# Patient Record
Sex: FEMALE | NOT HISPANIC OR LATINO | URBAN - METROPOLITAN AREA
[De-identification: names, ages, dates, MRNs, and addresses within clinical notes are randomized per-mention and may not be internally consistent; named-entity substitution may affect disease eponyms.]

---

## 2018-01-30 ENCOUNTER — AMBULATORY - HEALTHEAST (OUTPATIENT)
Dept: LAB | Facility: CLINIC | Age: 28
End: 2018-01-30

## 2018-01-30 DIAGNOSIS — O26.851 SPOTTING IN FIRST TRIMESTER: ICD-10-CM

## 2018-01-30 LAB — HCG SERPL-ACNC: ABNORMAL MLU/ML (ref 0–4)

## 2018-08-31 ASSESSMENT — MIFFLIN-ST. JEOR: SCORE: 1439.83

## 2018-09-05 ENCOUNTER — ANESTHESIA - HEALTHEAST (OUTPATIENT)
Dept: OBGYN | Facility: HOSPITAL | Age: 28
End: 2018-09-05

## 2018-09-05 ENCOUNTER — SURGERY - HEALTHEAST (OUTPATIENT)
Dept: OBGYN | Facility: HOSPITAL | Age: 28
End: 2018-09-05

## 2018-09-07 ENCOUNTER — HOME CARE/HOSPICE - HEALTHEAST (OUTPATIENT)
Dept: HOME HEALTH SERVICES | Facility: HOME HEALTH | Age: 28
End: 2018-09-07

## 2018-09-08 ENCOUNTER — HOME CARE/HOSPICE - HEALTHEAST (OUTPATIENT)
Dept: HOME HEALTH SERVICES | Facility: HOME HEALTH | Age: 28
End: 2018-09-08

## 2021-06-01 VITALS — HEIGHT: 60 IN | WEIGHT: 171.25 LBS | BODY MASS INDEX: 33.62 KG/M2

## 2021-06-16 PROBLEM — Z98.890 STATUS POST DILATION AND CURETTAGE: Status: ACTIVE | Noted: 2018-09-06

## 2021-06-16 PROBLEM — O42.119 PRETERM PROM WITH ONSET OF LABOR MORE THAN 24 HOURS FOLLOWING RUPTURE: Status: ACTIVE | Noted: 2018-09-06

## 2021-06-20 NOTE — ANESTHESIA PREPROCEDURE EVALUATION
Anesthesia Evaluation      Patient summary reviewed   No history of anesthetic complications     Airway   Mallampati: II  Neck ROM: full   Pulmonary - negative ROS and normal exam                          Cardiovascular - negative ROS     PE comment: Tachycardia,     Neuro/Psych - negative ROS     Endo/Other       Comments: PCOS    GI/Hepatic/Renal    (+) GERD,        Other findings:       Retained placenta s/p vaginal delivery      Dental - normal exam                        Anesthesia Plan  Planned anesthetic: general endotracheal  Glidescope intubation  Zofran/Decadron  ASA 2 - emergent   Induction: intravenous   Anesthetic plan and risks discussed with: patient and spouse  Anesthesia plan special considerations: video-assisted, rapid sequence induction, antiemetics,   Post-op plan: routine recovery

## 2021-06-20 NOTE — ANESTHESIA POSTPROCEDURE EVALUATION
Patient: Mary Jo DAWSON Ronning  REMOVAL, RETAINED PLACENTA, DILATION AND CURETTAGE, UTERUS  Anesthesia type: No value filed.    Patient location: PACU  Last vitals:   Vitals:    09/05/18 0500   BP: 113/70   Pulse: 92   Resp: 20   Temp: 37.6  C (99.7  F)   SpO2: 99%     Post vital signs: stable  Level of consciousness: awake and responds to simple questions  Post-anesthesia pain: pain controlled  Post-anesthesia nausea and vomiting: no  Pulmonary: unassisted, return to baseline  Cardiovascular: stable and blood pressure at baseline  Hydration: adequate  Anesthetic events: no    QCDR Measures:  ASA# 11 - Cristiana-op Cardiac Arrest: ASA11B - Patient did NOT experience unanticipated cardiac arrest  ASA# 12 - Cristiana-op Mortality Rate: ASA12B - Patient did NOT die  ASA# 13 - PACU Re-Intubation Rate: ASA13B - Patient did NOT require a new airway mgmt  ASA# 10 - Composite Anes Safety: ASA10A - No serious adverse event    Additional Notes:

## 2021-06-20 NOTE — ANESTHESIA CARE TRANSFER NOTE
Last vitals:   Vitals:    09/05/18 0420   BP: 120/76   Pulse: (!) 115   Resp: 18   Temp:    SpO2: 100%     Patient's level of consciousness is drowsy  Spontaneous respirations: yes  Maintains airway independently: yes  Dentition unchanged: yes  Oropharynx: oropharynx clear of all foreign objects    QCDR Measures:  ASA# 20 - Surgical Safety Checklist: WHO surgical safety checklist completed prior to induction  PQRS# 430 - Adult PONV Prevention: 4558F - Pt received => 2 anti-emetic agents (different classes) preop & intraop  ASA# 8 - Peds PONV Prevention: NA - Not pediatric patient, not GA or 2 or more risk factors NOT present  PQRS# 424 - Cristiana-op Temp Management: 4559F - At least one body temp DOCUMENTED => 35.5C or 95.9F within required timeframe  PQRS# 426 - PACU Transfer Protocol: - Transfer of care checklist used  ASA# 14 - Acute Post-op Pain: ASA14B - Patient did NOT experience pain >= 7 out of 10

## 2021-06-27 ENCOUNTER — HEALTH MAINTENANCE LETTER (OUTPATIENT)
Age: 31
End: 2021-06-27

## 2021-07-14 PROBLEM — Z34.90 PREGNANT: Status: RESOLVED | Noted: 2018-08-31 | Resolved: 2018-09-06

## 2021-10-17 ENCOUNTER — HEALTH MAINTENANCE LETTER (OUTPATIENT)
Age: 31
End: 2021-10-17

## 2022-07-23 ENCOUNTER — HEALTH MAINTENANCE LETTER (OUTPATIENT)
Age: 32
End: 2022-07-23

## 2022-10-01 ENCOUNTER — HEALTH MAINTENANCE LETTER (OUTPATIENT)
Age: 32
End: 2022-10-01

## 2023-08-12 ENCOUNTER — HEALTH MAINTENANCE LETTER (OUTPATIENT)
Age: 33
End: 2023-08-12

## 2024-12-01 ENCOUNTER — HOSPITAL ENCOUNTER (EMERGENCY)
Facility: CLINIC | Age: 34
Discharge: HOME | End: 2024-12-01
Attending: EMERGENCY MEDICINE | Admitting: EMERGENCY MEDICINE
Payer: COMMERCIAL

## 2024-12-01 ENCOUNTER — APPOINTMENT (OUTPATIENT)
Dept: CT IMAGING | Facility: CLINIC | Age: 34
End: 2024-12-01
Payer: COMMERCIAL

## 2024-12-01 ENCOUNTER — APPOINTMENT (OUTPATIENT)
Dept: ULTRASOUND IMAGING | Facility: CLINIC | Age: 34
End: 2024-12-01
Payer: COMMERCIAL

## 2024-12-01 VITALS
WEIGHT: 180 LBS | OXYGEN SATURATION: 95 % | HEIGHT: 60 IN | TEMPERATURE: 98 F | RESPIRATION RATE: 18 BRPM | DIASTOLIC BLOOD PRESSURE: 71 MMHG | BODY MASS INDEX: 35.34 KG/M2 | HEART RATE: 79 BPM | SYSTOLIC BLOOD PRESSURE: 132 MMHG

## 2024-12-01 DIAGNOSIS — N20.1 URETEROLITHIASIS: ICD-10-CM

## 2024-12-01 LAB
ALBUMIN SERPL BCG-MCNC: 4.4 G/DL (ref 3.5–5.2)
ALBUMIN UR-MCNC: 30 MG/DL
ALP SERPL-CCNC: 89 U/L (ref 40–150)
ALT SERPL W P-5'-P-CCNC: 17 U/L (ref 0–50)
ANION GAP SERPL CALCULATED.3IONS-SCNC: 12 MMOL/L (ref 7–15)
APPEARANCE UR: CLEAR
AST SERPL W P-5'-P-CCNC: 22 U/L (ref 0–45)
BACTERIA #/AREA URNS HPF: ABNORMAL /HPF
BASOPHILS # BLD AUTO: 0 10E3/UL (ref 0–0.2)
BASOPHILS NFR BLD AUTO: 1 %
BILIRUB DIRECT SERPL-MCNC: <0.2 MG/DL (ref 0–0.3)
BILIRUB SERPL-MCNC: 0.3 MG/DL
BILIRUB UR QL STRIP: NEGATIVE
BUN SERPL-MCNC: 13.8 MG/DL (ref 6–20)
CALCIUM SERPL-MCNC: 9.2 MG/DL (ref 8.8–10.4)
CHLORIDE SERPL-SCNC: 104 MMOL/L (ref 98–107)
COLOR UR AUTO: YELLOW
CREAT SERPL-MCNC: 0.81 MG/DL (ref 0.51–0.95)
EGFRCR SERPLBLD CKD-EPI 2021: >90 ML/MIN/1.73M2
EOSINOPHIL # BLD AUTO: 0.3 10E3/UL (ref 0–0.7)
EOSINOPHIL NFR BLD AUTO: 7 %
ERYTHROCYTE [DISTWIDTH] IN BLOOD BY AUTOMATED COUNT: 12.2 % (ref 10–15)
GLUCOSE SERPL-MCNC: 100 MG/DL (ref 70–99)
GLUCOSE UR STRIP-MCNC: NEGATIVE MG/DL
HCG INTACT+B SERPL-ACNC: <1 MIU/ML
HCO3 SERPL-SCNC: 23 MMOL/L (ref 22–29)
HCT VFR BLD AUTO: 35.9 % (ref 35–47)
HGB BLD-MCNC: 12.1 G/DL (ref 11.7–15.7)
HGB UR QL STRIP: ABNORMAL
IMM GRANULOCYTES # BLD: 0 10E3/UL
IMM GRANULOCYTES NFR BLD: 0 %
KETONES UR STRIP-MCNC: 20 MG/DL
LEUKOCYTE ESTERASE UR QL STRIP: ABNORMAL
LIPASE SERPL-CCNC: 19 U/L (ref 13–60)
LYMPHOCYTES # BLD AUTO: 0.6 10E3/UL (ref 0.8–5.3)
LYMPHOCYTES NFR BLD AUTO: 15 %
MCH RBC QN AUTO: 26.7 PG (ref 26.5–33)
MCHC RBC AUTO-ENTMCNC: 33.7 G/DL (ref 31.5–36.5)
MCV RBC AUTO: 79 FL (ref 78–100)
MONOCYTES # BLD AUTO: 0.4 10E3/UL (ref 0–1.3)
MONOCYTES NFR BLD AUTO: 10 %
MUCOUS THREADS #/AREA URNS LPF: PRESENT /LPF
NEUTROPHILS # BLD AUTO: 2.7 10E3/UL (ref 1.6–8.3)
NEUTROPHILS NFR BLD AUTO: 68 %
NITRATE UR QL: NEGATIVE
NRBC # BLD AUTO: 0 10E3/UL
NRBC BLD AUTO-RTO: 0 /100
PH UR STRIP: 5.5 [PH] (ref 5–7)
PLATELET # BLD AUTO: 176 10E3/UL (ref 150–450)
POTASSIUM SERPL-SCNC: 4.2 MMOL/L (ref 3.4–5.3)
PROT SERPL-MCNC: 7 G/DL (ref 6.4–8.3)
RADIOLOGIST FLAGS: NORMAL
RBC # BLD AUTO: 4.53 10E6/UL (ref 3.8–5.2)
RBC URINE: 43 /HPF
SODIUM SERPL-SCNC: 139 MMOL/L (ref 135–145)
SP GR UR STRIP: 1.03 (ref 1–1.03)
SQUAMOUS EPITHELIAL: 4 /HPF
UROBILINOGEN UR STRIP-MCNC: <2 MG/DL
WBC # BLD AUTO: 3.9 10E3/UL (ref 4–11)
WBC URINE: 4 /HPF

## 2024-12-01 PROCEDURE — 250N000011 HC RX IP 250 OP 636

## 2024-12-01 PROCEDURE — 99285 EMERGENCY DEPT VISIT HI MDM: CPT | Mod: 25

## 2024-12-01 PROCEDURE — 80048 BASIC METABOLIC PNL TOTAL CA: CPT

## 2024-12-01 PROCEDURE — 85004 AUTOMATED DIFF WBC COUNT: CPT

## 2024-12-01 PROCEDURE — 80053 COMPREHEN METABOLIC PANEL: CPT

## 2024-12-01 PROCEDURE — 83690 ASSAY OF LIPASE: CPT

## 2024-12-01 PROCEDURE — 74177 CT ABD & PELVIS W/CONTRAST: CPT

## 2024-12-01 PROCEDURE — 87086 URINE CULTURE/COLONY COUNT: CPT

## 2024-12-01 PROCEDURE — 81003 URINALYSIS AUTO W/O SCOPE: CPT

## 2024-12-01 PROCEDURE — 84702 CHORIONIC GONADOTROPIN TEST: CPT

## 2024-12-01 PROCEDURE — 84460 ALANINE AMINO (ALT) (SGPT): CPT

## 2024-12-01 PROCEDURE — 36415 COLL VENOUS BLD VENIPUNCTURE: CPT

## 2024-12-01 PROCEDURE — 96372 THER/PROPH/DIAG INJ SC/IM: CPT | Mod: 59

## 2024-12-01 PROCEDURE — 93976 VASCULAR STUDY: CPT

## 2024-12-01 PROCEDURE — 96365 THER/PROPH/DIAG IV INF INIT: CPT | Mod: 59

## 2024-12-01 PROCEDURE — 82248 BILIRUBIN DIRECT: CPT

## 2024-12-01 RX ORDER — CEFTRIAXONE 1 G/1
1 INJECTION, POWDER, FOR SOLUTION INTRAMUSCULAR; INTRAVENOUS ONCE
Status: COMPLETED | OUTPATIENT
Start: 2024-12-01 | End: 2024-12-01

## 2024-12-01 RX ORDER — ACETAMINOPHEN 500 MG
1000 TABLET ORAL EVERY 6 HOURS
Qty: 56 TABLET | Refills: 0 | Status: SHIPPED | OUTPATIENT
Start: 2024-12-01 | End: 2024-12-08

## 2024-12-01 RX ORDER — IOPAMIDOL 755 MG/ML
90 INJECTION, SOLUTION INTRAVASCULAR ONCE
Status: COMPLETED | OUTPATIENT
Start: 2024-12-01 | End: 2024-12-01

## 2024-12-01 RX ORDER — IBUPROFEN 200 MG
400 TABLET ORAL EVERY 6 HOURS
Qty: 56 TABLET | Refills: 0 | Status: SHIPPED | OUTPATIENT
Start: 2024-12-01 | End: 2024-12-08

## 2024-12-01 RX ORDER — KETOROLAC TROMETHAMINE 15 MG/ML
15 INJECTION, SOLUTION INTRAMUSCULAR; INTRAVENOUS ONCE
Status: COMPLETED | OUTPATIENT
Start: 2024-12-01 | End: 2024-12-01

## 2024-12-01 RX ADMIN — CEFTRIAXONE 1 G: 1 INJECTION, POWDER, FOR SOLUTION INTRAMUSCULAR; INTRAVENOUS at 15:25

## 2024-12-01 RX ADMIN — KETOROLAC TROMETHAMINE 15 MG: 15 INJECTION, SOLUTION INTRAMUSCULAR; INTRAVENOUS at 16:33

## 2024-12-01 RX ADMIN — IOPAMIDOL 90 ML: 755 INJECTION, SOLUTION INTRAVENOUS at 15:30

## 2024-12-01 ASSESSMENT — ACTIVITIES OF DAILY LIVING (ADL)
ADLS_ACUITY_SCORE: 41

## 2024-12-01 ASSESSMENT — COLUMBIA-SUICIDE SEVERITY RATING SCALE - C-SSRS
2. HAVE YOU ACTUALLY HAD ANY THOUGHTS OF KILLING YOURSELF IN THE PAST MONTH?: NO
6. HAVE YOU EVER DONE ANYTHING, STARTED TO DO ANYTHING, OR PREPARED TO DO ANYTHING TO END YOUR LIFE?: NO
1. IN THE PAST MONTH, HAVE YOU WISHED YOU WERE DEAD OR WISHED YOU COULD GO TO SLEEP AND NOT WAKE UP?: NO

## 2024-12-01 NOTE — Clinical Note
Mary Jo Vital was seen and treated in our emergency department on 12/1/2024.  She may return to work on 12/04/2024.       If you have any questions or concerns, please don't hesitate to call.      Christos Luna, DO

## 2024-12-01 NOTE — ED PROVIDER NOTES
EMERGENCY DEPARTMENT ENCOUNTER      NAME: Mary Jo Vital  AGE: 34 year old female  YOB: 1990  MRN: [id number]  EVALUATION DATE & TIME: 12/1/2024  2:04 PM    PCP: Ro Kemp    ED PROVIDER: Francheska Sparks PA-C      Chief Complaint   Patient presents with    Flank Pain         FINAL IMPRESSION:  1. Ureterolithiasis          ED COURSE & MEDICAL DECISION MAKING:    Pertinent Labs & Imaging studies reviewed. (See chart for details)  34 year old female presents to the Emergency Department for evaluation of right flank pain that radiates to her right lower quadrant.  Started at 8 PM yesterday evening.  Denies a history of kidney stones.  Vital signs reviewed and unremarkable.  Afebrile.  On exam, patient is alert and answering questions appropriately.  Right flank tenderness and right lower quadrant abdominal pain.  Lungs are clear to auscultation bilaterally.  No normal rate.  No rash.    Differential diagnosis includes kidney stone, UTI, hydronephrosis, pyelonephritis, diverticulitis.  CBC shows a white blood cell count of 3.9.  Hemoglobin is 12.1.  Basic shows a sodium and potassium within normal limits.  Anion gap is 12.  Creatinine is 0.81.  Patient hepatic is within normal limits.  Patient's not pregnant.  UA shows 250 leuks with no nitrates.  Culture is pending.  High suspicion that this is contaminated but out of abundance of precaution patient did receive a dose of ceftriaxone here.  I did discuss the UA results with Dr. Luna who feels the urine was contaminated. CT of the abdomen shows an obstructing 5 mm distal right urethral calculus in the urethrovesical junction resulting in moderate right hydronephrosis.  2 punctate obstructing right intrarenal calculi.  No left renal calculi.  Intermediate 1.8 cm right hepatic lesion most likely hemangioma.  Recommended a nonemergent contrast enhancing liver MRI.  Mild spinal megaly.  Small hiatal hernia.  Patient was educated on the results and  agrees with the plan.  Patient will follow-up with  urology and primary care doctor to discuss her ED visit.  Patient return to the ED if new symptoms develop or symptoms worsen.  Patient was prescribed ibuprofen, Tylenol.  Patient will be referred to Urology.  Strict return precautions were provided to the patient.    ED COURSE:   2:16 PM I saw the patient.   3:00 PM Staffed with Dr. Luna.   5:03 PM Patient will be discharge home.  Patient is a plan.  All questions answered.       At the conclusion of the encounter I discussed the results of all of the tests and the disposition. The questions were answered. The patient or family acknowledged understanding and was agreeable with the care plan.     0 minutes of critical care time       Medical Decision Making  Obtained supplemental history:Supplemental history obtained?: No  Reviewed external records: External records reviewed?: Documented in chart  Care impacted by chronic illness: PCOS  Care significantly affected by social determinants of health:N/A  Did you consider but not order tests?: Work up considered but not performed and documented in chart, if applicable  Did you interpret images independently?: Independent interpretation of ECG and images noted in documentation, when applicable.  Consultation discussion with other provider:Did you involve another provider (consultant, MH, pharmacy, etc.)?: I discussed the care with another health care provider, see documentation for details.  Discharge. I prescribed additional prescription strength medication(s) as charted. See documentation for any additional details.    Not Applicable    MEDICATIONS GIVEN IN THE EMERGENCY:  Medications   cefTRIAXone (ROCEPHIN) 1 g vial to attach to  mL bag for ADULTS or NS 50 mL bag for PEDS (0 g Intravenous Stopped 12/1/24 1628)   iopamidol (ISOVUE-370) solution 90 mL (90 mLs Intravenous $Given 12/1/24 1530)   ketorolac (TORADOL) injection 15 mg (15 mg Intramuscular $Given  12/1/24 1633)       NEW PRESCRIPTIONS STARTED AT TODAY'S ER VISIT  New Prescriptions    ACETAMINOPHEN (TYLENOL) 500 MG TABLET    Take 2 tablets (1,000 mg) by mouth every 6 hours for 7 days.    IBUPROFEN (ADVIL/MOTRIN) 200 MG TABLET    Take 2 tablets (400 mg) by mouth every 6 hours for 7 days.          =================================================================    HPI    Patient information was obtained from: patient    Use of : N/A         Mary Jo Vital is a 34 year old female with a pertinent history of PCOS who presents to this ED via private vehicle with significant other for evaluation of abdominal pain.    Patient reports onset of constant, progressively worsening RLQ abdominal pain and right lower back pain that began yesterday evening around 16:00. Notes associated diaphoresis last night as well. No fever or chills. She took an epsom salt bath without relief. Denies history of kidney stones. Of note, patient is unsure about possibility of pregnancy, stating she is currently breastfeeding her five month old and has not had return of her menstrual period since most recent vaginal delivery. Patient otherwise denies vaginal bleeding or discharge, dysuria, hematuria, urinary urgency or frequency, or any other symptoms or concerns at this time.        REVIEW OF SYSTEMS   As per HPI    PAST MEDICAL HISTORY:  Past Medical History:   Diagnosis Date    PCOS (polycystic ovarian syndrome)        PAST SURGICAL HISTORY:  Past Surgical History:   Procedure Laterality Date    HC DILATION/CURETTAGE DIAG/THER NON OB N/A 9/5/2018    Procedure: DILATION AND CURETTAGE, UTERUS;  Surgeon: Ayana Garcia MD;  [facility];  Service: Obstetrics    NO PAST SURGERIES             CURRENT MEDICATIONS:    acetaminophen (TYLENOL) 500 MG tablet  ibuprofen (ADVIL/MOTRIN) 200 MG tablet  cholecalciferol, vitamin D3, (VITAMIN D3) 4,000 unit cap  dibucaine (NUPERCAINAL) 1 % ointment  lanolin (LANSINOH HPA)  100 % Oint  prenatal vitamin iron-folic acid 27mg-0.8mg (PRENATAL S) 27 mg iron- 800 mcg Tab tablet  witch hazel (TUCKS) 12.5-50 % PadM        ALLERGIES:  Allergies   Allergen Reactions    Latex Unknown    Sulfa (Sulfonamide Antibiotics) [Sulfa Antibiotics] Unknown       FAMILY HISTORY:  No family history on file.    SOCIAL HISTORY:   Social History     Socioeconomic History    Marital status:    Tobacco Use    Smoking status: Never    Smokeless tobacco: Never   Substance and Sexual Activity    Alcohol use: No    Drug use: No     Social Drivers of Health     Financial Resource Strain: Low Risk  (6/25/2024)    Received from [facility]    Financial Resource Strain     Difficulty of Paying Living Expenses: 3   Food Insecurity: No Food Insecurity (6/25/2024)    Received from [facility]    Food Insecurity     Do you worry your food will run out before you are able to buy more?: 1   Transportation Needs: No Transportation Needs (6/25/2024)    Received from [facility]    Transportation Needs     Does lack of transportation keep you from medical appointments?: 1     Does lack of transportation keep you from work, meetings or getting things that you need?: 1   Social Connections: Socially Integrated (6/25/2024)    Received from [facility]    Social Connections     Do you often feel lonely or isolated from those around you?: 0   Housing Stability: Low Risk  (6/25/2024)    Received from [facility]    Housing Stability     What is your housing situation today?: 1       VITALS:  /64   Pulse 79   Temp 98  F (36.7  C) (Oral)   Resp 18   Ht 1.524 m (5')   Wt 81.6 kg (180 lb)   SpO2 95%   Breastfeeding Yes   BMI 35.15 kg/m      PHYSICAL EXAM    Physical Exam  Vitals and nursing note reviewed.   Constitutional:       Appearance: Normal  appearance.   HENT:      Head: Atraumatic.      Right Ear: External ear normal.      Left Ear: External ear normal.      Nose: Nose normal.      Mouth/Throat:      Mouth: Mucous membranes are moist.   Eyes:      Conjunctiva/sclera: Conjunctivae normal.      Pupils: Pupils are equal, round, and reactive to light.   Cardiovascular:      Rate and Rhythm: Normal rate and regular rhythm.      Pulses: Normal pulses.      Heart sounds: Normal heart sounds. No murmur heard.     No friction rub. No gallop.   Pulmonary:      Effort: Pulmonary effort is normal.      Breath sounds: Normal breath sounds. No wheezing or rales.   Abdominal:      Tenderness: There is abdominal tenderness (RLQ). There is no guarding or rebound.   Musculoskeletal:      Cervical back: Normal range of motion.   Skin:     General: Skin is dry.   Neurological:      Mental Status: She is alert. Mental status is at baseline.   Psychiatric:         Mood and Affect: Mood normal.         Thought Content: Thought content normal.          LAB:  All pertinent labs reviewed and interpreted.  Labs Ordered and Resulted from Time of ED Arrival to Time of ED Departure   BASIC METABOLIC PANEL - Abnormal       Result Value    Sodium 139      Potassium 4.2      Chloride 104      Carbon Dioxide (CO2) 23      Anion Gap 12      Urea Nitrogen 13.8      Creatinine 0.81      GFR Estimate >90      Calcium 9.2      Glucose 100 (*)    ROUTINE UA WITH MICROSCOPIC REFLEX TO CULTURE - Abnormal    Color Urine Yellow      Appearance Urine Clear      Glucose Urine Negative      Bilirubin Urine Negative      Ketones Urine 20 (*)     Specific Gravity Urine 1.031 (*)     Blood Urine 0.2 mg/dL (*)     pH Urine 5.5      Protein Albumin Urine 30 (*)     Urobilinogen Urine <2.0      Nitrite Urine Negative      Leukocyte Esterase Urine 250 Louis/uL (*)     Bacteria Urine Few (*)     Mucus Urine Present (*)     RBC Urine 43 (*)     WBC Urine 4      Squamous Epithelials Urine 4 (*)    CBC WITH  PLATELETS AND DIFFERENTIAL - Abnormal    WBC Count 3.9 (*)     RBC Count 4.53      Hemoglobin 12.1      Hematocrit 35.9      MCV 79      MCH 26.7      MCHC 33.7      RDW 12.2      Platelet Count 176      % Neutrophils 68      % Lymphocytes 15      % Monocytes 10      % Eosinophils 7      % Basophils 1      % Immature Granulocytes 0      NRBCs per 100 WBC 0      Absolute Neutrophils 2.7      Absolute Lymphocytes 0.6 (*)     Absolute Monocytes 0.4      Absolute Eosinophils 0.3      Absolute Basophils 0.0      Absolute Immature Granulocytes 0.0      Absolute NRBCs 0.0     LIPASE - Normal    Lipase 19     HEPATIC FUNCTION PANEL - Normal    Protein Total 7.0      Albumin 4.4      Bilirubin Total 0.3      Alkaline Phosphatase 89      AST 22      ALT 17      Bilirubin Direct <0.20     HCG QUANTITATIVE PREGNANCY - Normal    hCG Quantitative <1     URINE CULTURE        RADIOLOGY:  Reviewed all pertinent imaging. Please see official radiology report.  CT Abdomen Pelvis w Contrast   Final Result   IMPRESSION:       1.  Obstructing 5 mm distal right ureteral calculus at the ureterovesical junction resulting in moderate right hydronephrosis.      2.  Two punctate nonobstructing right intrarenal calculi. No left renal calculi.      3.  Indeterminant 1.8 cm right hepatic lesion, most likely a hemangioma. This can be confirmed with a nonemergent contrast-enhanced liver MRI.      4.  Mild splenomegaly.      5.  Small hiatal hernia.      [Recommend Follow Up: Liver lesion]      This report will be copied to the [facility] to ensure a provider acknowledges the finding.      US Pelvis Cmplt w Transvag & Doppler LmtPel Duplex Limited   Final Result   IMPRESSION:        1.  No evidence of ovarian torsion or other acute abnormality.      2.  Small amount of free fluid within the pelvis, within physiologic limits.          Eufemia FIELDS, am serving as a scribe to document services personally performed by Francheska Sparks,  LAZARO, based on my observation and the provider's statements to me. I, Francheska Sparks PA-C, attest that Eufemia Santana is acting in a scribe capacity, has observed my performance of the services and has documented them in accordance with my direction.    Francheska Sparks PA-C  [facility] EMERGENCY ROOM  [address] [id number] [phone]      Francheska Sparks PA-C  12/05/24 1538

## 2024-12-01 NOTE — ED PROVIDER NOTES
Emergency Department Midlevel Supervisory Note     I had a face to face encounter with this patient seen by the Advanced Practice Provider (DARIN). I personally made/approved the management plan and take responsibility for the patient management. I personally saw patient and performed a substantive portion of the visit including all aspects of the medical decision making.     ED Course:  3:07 PM Francheska Sparks PA-C staffed patient with me. I agree with their assessment and plan of management, and I will see the patient.  3:09 PM I met with the patient to introduce myself, gather additional history, perform my initial exam, and discuss the plan.     Brief HPI:     Mary Jo Vital is a 34 year old female who presents for evaluation of abdominal pain. Constant, progressively worsening RLQ abdominal pain and right lower back pain that began yesterday evening around 16:00. Notes associated diaphoresis last night as well. Epsom salt bath with no relief. Unsure about possibility of pregnancy, stating she is currently breastfeeding her five month old and has not had return of her menstrual period since most recent vaginal delivery. No other complaints or concerns at this time.      I, Consuelo Beard, am serving as a scribe to document services personally performed by Christos Luna DO, based on my observations and the provider's statements to me.   I, Christos Luna DO attest that Consuelo Beard was acting in a scribe capacity, has observed my performance of the services and has documented them in accordance with my direction.    Brief Physical Exam: /71   Pulse 79   Temp 98  F (36.7  C) (Oral)   Resp 18   Ht 1.524 m (5')   Wt 81.6 kg (180 lb)   SpO2 95%   Breastfeeding Yes   BMI 35.15 kg/m    Constitutional:  Alert, in no acute distress  EYES: Conjunctivae clear  HENT:  Atraumatic  Respiratory:  Respirations even, unlabored, in no acute respiratory distress  Cardiovascular:  Regular rate and rhythm, good peripheral  perfusion  GI: Soft, non-distended, mild right lower quadrant tenderness without rebound or guarding  Musculoskeletal:  Moves all 4 extremities equally, grossly symmetrical strength  Back:  no CVAT, no midline or paraspinal tenderness  Integument: Warm & dry. No appreciable rash, erythema.  Neurologic:  Alert & oriented, speech clear and fluent, no focal deficits noted  Psych: Normal mood and affect       MDM:  34-year-old female presenting to the emergency department with complaint of right lower quadrant pain and right-sided flank pain.  Initial concern was for pyelonephritis versus urolithiasis versus appendicitis versus other acute cause.  We did give a dose of Rocephin, but after further evaluation of the patient's lab testing, including UA, does not appear that this represents a pyelonephritis or infected urolithiasis.  CT scan was performed without evidence of appendicitis, colitis, bowel obstruction, but does show a very obvious right-sided ureterolithiasis, and I do believe this to be the underlying cause of the patient's symptoms.  At this time, I do not see indication for admission and believe this patient can follow-up as an outpatient with urology/[facility].  Return precautions were discussed.       1. Ureterolithiasis          Labs and Imaging:  Results for orders placed or performed during the hospital encounter of 12/01/24   CT Abdomen Pelvis w Contrast   Result Value Ref Range    Radiologist flags Liver lesion     Impression    IMPRESSION:     1.  Obstructing 5 mm distal right ureteral calculus at the ureterovesical junction resulting in moderate right hydronephrosis.    2.  Two punctate nonobstructing right intrarenal calculi. No left renal calculi.    3.  Indeterminant 1.8 cm right hepatic lesion, most likely a hemangioma. This can be confirmed with a nonemergent contrast-enhanced liver MRI.    4.  Mild splenomegaly.    5.  Small hiatal hernia.    [Recommend Follow Up: Liver lesion]    This report will  be copied to the [facility] to ensure a provider acknowledges the finding.   US Pelvis Cmplt w Transvag & Doppler LmtPel Duplex Limited    Impression    IMPRESSION:      1.  No evidence of ovarian torsion or other acute abnormality.    2.  Small amount of free fluid within the pelvis, within physiologic limits.   Basic metabolic panel   Result Value Ref Range    Sodium 139 135 - 145 mmol/L    Potassium 4.2 3.4 - 5.3 mmol/L    Chloride 104 98 - 107 mmol/L    Carbon Dioxide (CO2) 23 22 - 29 mmol/L    Anion Gap 12 7 - 15 mmol/L    Urea Nitrogen 13.8 6.0 - 20.0 mg/dL    Creatinine 0.81 0.51 - 0.95 mg/dL    GFR Estimate >90 >60 mL/min/1.73m2    Calcium 9.2 8.8 - 10.4 mg/dL    Glucose 100 (H) 70 - 99 mg/dL   Result Value Ref Range    Lipase 19 13 - 60 U/L   Hepatic function panel   Result Value Ref Range    Protein Total 7.0 6.4 - 8.3 g/dL    Albumin 4.4 3.5 - 5.2 g/dL    Bilirubin Total 0.3 <=1.2 mg/dL    Alkaline Phosphatase 89 40 - 150 U/L    AST 22 0 - 45 U/L    ALT 17 0 - 50 U/L    Bilirubin Direct <0.20 0.00 - 0.30 mg/dL   UA with Microscopic reflex to Culture    Specimen: Urine, Clean Catch   Result Value Ref Range    Color Urine Yellow Colorless, Straw, Light Yellow, Yellow    Appearance Urine Clear Clear    Glucose Urine Negative Negative mg/dL    Bilirubin Urine Negative Negative    Ketones Urine 20 (A) Negative mg/dL    Specific Gravity Urine 1.031 (H) 1.001 - 1.030    Blood Urine 0.2 mg/dL (A) Negative    pH Urine 5.5 5.0 - 7.0    Protein Albumin Urine 30 (A) Negative mg/dL    Urobilinogen Urine <2.0 <2.0 mg/dL    Nitrite Urine Negative Negative    Leukocyte Esterase Urine 250 Louis/uL (A) Negative    Bacteria Urine Few (A) None Seen /HPF    Mucus Urine Present (A) None Seen /LPF    RBC Urine 43 (H) <=2 /HPF    WBC Urine 4 <=5 /HPF    Squamous Epithelials Urine 4 (H) <=1 /HPF   HCG quantitative pregnancy   Result Value Ref Range    hCG Quantitative <1 <5 mIU/mL   CBC with platelets and differential    Result Value Ref Range    WBC Count 3.9 (L) 4.0 - 11.0 10e3/uL    RBC Count 4.53 3.80 - 5.20 10e6/uL    Hemoglobin 12.1 11.7 - 15.7 g/dL    Hematocrit 35.9 35.0 - 47.0 %    MCV 79 78 - 100 fL    MCH 26.7 26.5 - 33.0 pg    MCHC 33.7 31.5 - 36.5 g/dL    RDW 12.2 10.0 - 15.0 %    Platelet Count 176 150 - 450 10e3/uL    % Neutrophils 68 %    % Lymphocytes 15 %    % Monocytes 10 %    % Eosinophils 7 %    % Basophils 1 %    % Immature Granulocytes 0 %    NRBCs per 100 WBC 0 <1 /100    Absolute Neutrophils 2.7 1.6 - 8.3 10e3/uL    Absolute Lymphocytes 0.6 (L) 0.8 - 5.3 10e3/uL    Absolute Monocytes 0.4 0.0 - 1.3 10e3/uL    Absolute Eosinophils 0.3 0.0 - 0.7 10e3/uL    Absolute Basophils 0.0 0.0 - 0.2 10e3/uL    Absolute Immature Granulocytes 0.0 <=0.4 10e3/uL    Absolute NRBCs 0.0 10e3/uL       I have reviewed the relevant laboratory studies above.    I independently interpreted the following imaging study(s):   CT Abdomen Pelvis w Contrast   Final Result   IMPRESSION:       1.  Obstructing 5 mm distal right ureteral calculus at the ureterovesical junction resulting in moderate right hydronephrosis.      2.  Two punctate nonobstructing right intrarenal calculi. No left renal calculi.      3.  Indeterminant 1.8 cm right hepatic lesion, most likely a hemangioma. This can be confirmed with a nonemergent contrast-enhanced liver MRI.      4.  Mild splenomegaly.      5.  Small hiatal hernia.      [Recommend Follow Up: Liver lesion]      This report will be copied to the [facility] to ensure a provider acknowledges the finding.      US Pelvis Cmplt w Transvag & Doppler LmtPel Duplex Limited   Final Result   IMPRESSION:        1.  No evidence of ovarian torsion or other acute abnormality.      2.  Small amount of free fluid within the pelvis, within physiologic limits.            [facility] EMERGENCY ROOM  [address] [address] [phone]        Christos Luna,   12/01/24 1926       Christos Luna,   12/01/24 1926

## 2024-12-01 NOTE — ED TRIAGE NOTES
Pt presents to the ED with c/o of right side flank pain that wraps around to the front that started a little yesterday with increased intensity today. Pt has some nausea with pain. Pt tried to have a BM without relief. Pain only on the right.      Triage Assessment (Adult)       Row Name 12/01/24 1401          Triage Assessment    Airway WDL WDL        Respiratory WDL    Respiratory WDL WDL        Skin Circulation/Temperature WDL    Skin Circulation/Temperature WDL WDL        Cardiac WDL    Cardiac WDL WDL        Peripheral/Neurovascular WDL    Peripheral Neurovascular WDL WDL        Cognitive/Neuro/Behavioral WDL    Cognitive/Neuro/Behavioral WDL WDL

## 2024-12-01 NOTE — DISCHARGE INSTRUCTIONS
Rest.  Orally hydrate.  Follow-up with your primary care doctor discussed ED visit.  Follow-up with urology soon as possible.  Return to the ED if new symptoms develop or symptoms worsen.  I prescribed you ibuprofen, Tylenol.  Please take your prescribed medications as prescribed.  Also strain your urine to catch your kidney stone.  Return to the ED if new symptoms develop or symptoms worsen.    Follow up with primary care provider to have Liver MRI to evaluate liver lesion.

## 2024-12-02 LAB — BACTERIA UR CULT: NORMAL

## 2024-12-08 ENCOUNTER — HEALTH MAINTENANCE LETTER (OUTPATIENT)
Age: 34
End: 2024-12-08

## 2024-12-10 ENCOUNTER — VIRTUAL VISIT (OUTPATIENT)
Dept: UROLOGY | Facility: CLINIC | Age: 34
End: 2024-12-10
Payer: COMMERCIAL

## 2024-12-10 DIAGNOSIS — N20.1 RIGHT URETERAL CALCULUS: ICD-10-CM

## 2024-12-10 DIAGNOSIS — N20.1 CALCULUS OF URETEROVESICAL JUNCTION (UVJ): Primary | ICD-10-CM

## 2024-12-10 PROCEDURE — 99203 OFFICE O/P NEW LOW 30 MIN: CPT | Mod: 95 | Performed by: NURSE PRACTITIONER

## 2024-12-10 ASSESSMENT — PAIN SCALES - GENERAL: PAINLEVEL_OUTOF10: MILD PAIN (2)

## 2024-12-10 NOTE — PROGRESS NOTES
Urology Video Office Visit    Video-Visit Details    Type of service:  Video Visit    Video Start Time: 1426    Video End Time: 1448    Originating Location (pt. Location): Home    Distant Location (provider location):  Off-site     Platform used for Video Visit: [employer]           Assessment and Plan:     Assessment:34 year old female with a right 5mm UVJ stone.    Plan:  -Reviewed CT scan with patient. Noted right UVJ stone.    -We discussed treatment options including observation with or without MET x 3-4 weeks vs. ESWL vs ureteroscopy and laser lithotripsy. I counseled the patient regarding the potential need for a ureteral stent after treatment and the necessity of removing the stent after surgery. After discussing risks and benefits, the patient elects to proceed with observation.  -Please use acetaminophen and ibuprofen PRN for pain control.   -RTC in 4 weeks with CT scan.     Padmini Rogel CNP  Department of Urology  December 10, 2024    I spent a total of 25 minutes spent on the date of the encounter doing chart review, history and exam, documentation, and further activities as noted above.          Chief Complaint:   Right Ureteral Stone         History of Present Illness:    Mary Jobreonna Vital is a pleasant 34 year old female who presents with concerns of a right ureteral stone.     Dr. Vital was seen in the ED on 12/01/24 for concerns of RLQ pain and low back pain.     CT scan on 12/01/24 (images personally reviewed) revealed a right 5mm UVJ stone with hydronephrosis. Noted two punctate nonobstructing right renal stones. No noted left hydronephrosis or nephrolithiasis.     She is doing well at this time. Denies any flank pain, f/c/n/v, gross hematuria, or dysuria. She has not seen her stone pass as of yet.     This is her first stone episode. Family history of nephrolithiasis in brother.     Stone Risk Factors: None    She is currently breastfeeding. Recently gave birth in June 2024.          Past  Medical History:     Past Medical History:   Diagnosis Date    PCOS (polycystic ovarian syndrome)             Past Surgical History:     Past Surgical History:   Procedure Laterality Date    HC DILATION/CURETTAGE DIAG/THER NON OB N/A 9/5/2018    Procedure: DILATION AND CURETTAGE, UTERUS;  Surgeon: Ayana Garcia MD;  Location: [facility];  Service: Obstetrics    NO PAST SURGERIES              Medications     Current Outpatient Medications   Medication Sig Dispense Refill    cholecalciferol, vitamin D3, (VITAMIN D3) 4,000 unit cap [CHOLECALCIFEROL, VITAMIN D3, (VITAMIN D3) 4,000 UNIT CAP] Take 1 tablet by mouth daily.  0    dibucaine (NUPERCAINAL) 1 % ointment [DIBUCAINE (NUPERCAINAL) 1 % OINTMENT] Apply 4 times daily prn perineal and perianal pain  0    lanolin (LANSINOH HPA) 100 % Oint [LANOLIN (LANSINOH HPA) 100 % OINT] Apply every hour as needed for breastfeeding pain relief.  0    prenatal vitamin iron-folic acid 27mg-0.8mg (PRENATAL S) 27 mg iron- 800 mcg Tab tablet [PRENATAL VITAMIN IRON-FOLIC ACID 27MG-0.8MG (PRENATAL S) 27 MG IRON- 800 MCG TAB TABLET] Take 1 tablet by mouth daily.      witch hazel (TUCKS) 12.5-50 % PadM [WITCH HAZEL (TUCKS) 12.5-50 % PADM] Apply every 1 hour prn perineal and perinanal discomfort.  0     No current facility-administered medications for this visit.            Family History:   No family history on file.         Social History:     Social History     Socioeconomic History    Marital status:      Spouse name: Not on file    Number of children: Not on file    Years of education: Not on file    Highest education level: Not on file   Occupational History    Not on file   Tobacco Use    Smoking status: Never    Smokeless tobacco: Never   Substance and Sexual Activity    Alcohol use: No    Drug use: No    Sexual activity: Not on file   Other Topics Concern    Not on file   Social History Narrative    Not on file     Social Drivers of Health     Financial Resource  Strain: Low Risk  (6/25/2024)    [facility]    Financial Resource Strain     Difficulty of Paying Living Expenses: 3     Difficulty of Paying Living Expenses: Not on file   Food Insecurity: No Food Insecurity (6/25/2024)    Received from [facility] [employer] [facility]    Food Insecurity     Do you worry your food will run out before you are able to buy more?: 1   Transportation Needs: No Transportation Needs (6/25/2024)    Received from [facility] [employer] [facility]    Transportation Needs     Does lack of transportation keep you from medical appointments?: 1     Does lack of transportation keep you from work, meetings or getting things that you need?: 1   Physical Activity: Not on file   Stress: Not on file   Social Connections: Socially Integrated (6/25/2024)    Received from [facility] [employer] [facility]    Social Connections     Do you often feel lonely or isolated from those around you?: 0   Interpersonal Safety: Not on file   Housing Stability: Low Risk  (6/25/2024)    [facility]    Housing Stability     What is your housing situation today?: 1            Allergies:   Latex and Sulfa (sulfonamide antibiotics) [sulfa antibiotics]         Review of Systems:  From intake questionnaire   Negative 14 system review except as noted on HPI, nurse's note.         Physical Exam:   General Appearance: Well groomed, hygenic  Eyes: No redness, discharge  Respiratory: No cough, no respiratory distress or labored breathing  Musculoskeletal: Grossly normal, full range of motion in upper extremities, no gross deficits  Skin: No discoloration or apparent rashes  Neurologic - No tremors  Psychiatric - Alert and oriented  The rest of a comprehensive physical examination is deferred due to video visit restrictions        Labs:    I personally reviewed all applicable laboratory data and went  over findings with patient  Significant for:    CBC RESULTS:  Recent Labs   Lab Test 12/01/24  1430 09/06/18  0855 09/05/18  0347 09/01/18  1157   WBC 3.9*  --  16.0* 10.9   HGB 12.1 7.6* 8.1* 10.9*     --  161 205        BMP RESULTS:  Recent Labs   Lab Test 12/01/24  1430 09/05/18  2106 09/05/18  0347     --  133*   POTASSIUM 4.2  --  3.4*   CHLORIDE 104  --  108*   CO2 23  --  17*   ANIONGAP 12  --  8   * 55 118   BUN 13.8  --  8   CR 0.81  --  0.61   GFRESTIMATED >90  --  >60   GFRESTBLACK  --   --  >60   GEORGIE 9.2  --  7.6*       UA RESULTS:   Recent Labs   Lab Test 12/01/24  1431   SG 1.031*   URINEPH 5.5   NITRITE Negative   RBCU 43*   WBCU 4       CALCIUM RESULTS  Lab Results   Component Value Date    GEORGIE 9.2 12/01/2024    GEORGIE 7.6 09/05/2018           Imaging:    I personally reviewed all applicable imaging and went over the below findings with patient.    EXAM: CT ABDOMEN PELVIS W CONTRAST  LOCATION: [facility]  DATE: 12/1/2024     INDICATION: Right lower quadrant abdominal pain.  COMPARISON: Pelvic ultrasound 12/1/2024.  TECHNIQUE: CT scan of the abdomen and pelvis was performed following injection of IV contrast. Multiplanar reformats were obtained. Dose reduction techniques were used.  CONTRAST: ISOVUE 370 90mL     FINDINGS:      LOWER CHEST: Small hiatal hernia.     HEPATOBILIARY: Heterogeneously enhancing 1.8 cm lesion within hepatic segment 7 (3/#38). Small benign hepatic cysts within both hepatic lobes. No calcified gallstones or biliary ductal dilation.     PANCREAS: Normal.     SPLEEN: Splenomegaly measuring 14.6 cm.     ADRENAL GLANDS: Normal.     KIDNEYS/BLADDER: Obstructing 5 mm calculus at the right uterovesical junction resulting in moderate upstream right ureterectasis and hydronephrosis. Two punctate nonobstructing right intrarenal calculi. Normal left kidney. Bladder is partially   decompressed and otherwise normal.     BOWEL: No bowel obstruction  or inflammation. Normal appendix.     LYMPH NODES: No enlarged lymph nodes.     VASCULATURE: Patent portal, splenic, and superior mesenteric veins. No abdominal aortic aneurysm.     PELVIC ORGANS: Normal.     MUSCULOSKELETAL: Small fat-containing periumbilical hernia. No acute bony abnormality.                                                                      IMPRESSION:      1.  Obstructing 5 mm distal right ureteral calculus at the ureterovesical junction resulting in moderate right hydronephrosis.     2.  Two punctate nonobstructing right intrarenal calculi. No left renal calculi.     3.  Indeterminant 1.8 cm right hepatic lesion, most likely a hemangioma. This can be confirmed with a nonemergent contrast-enhanced liver MRI.     4.  Mild splenomegaly.     5.  Small hiatal hernia.

## 2024-12-10 NOTE — NURSING NOTE
Current patient location: [facility] [address] [facility] [address]    Is the patient currently in the state of [place]? YES    Visit mode:VIDEO    If the visit is dropped, the patient can be reconnected by:VIDEO VISIT: Text to cell phone:   Telephone Information:   Mobile [phone]       Will anyone else be joining the visit? NO  (If patient encounters technical issues they should call [phone])    Are changes needed to the allergy or medication list? No, verified at e-check in    Are refills needed on medications prescribed by this physician? NO    Rooming Documentation:  Not applicable    Reason for visit: Consult (NEW KIDNEY STONE )    Latanya RAMIREZ

## 2025-01-13 ENCOUNTER — VIRTUAL VISIT (OUTPATIENT)
Dept: UROLOGY | Facility: CLINIC | Age: 35
End: 2025-01-13
Payer: COMMERCIAL

## 2025-01-13 DIAGNOSIS — N20.0 NEPHROLITHIASIS: Primary | ICD-10-CM

## 2025-01-13 PROCEDURE — 98005 SYNCH AUDIO-VIDEO EST LOW 20: CPT | Performed by: NURSE PRACTITIONER

## 2025-01-13 NOTE — NURSING NOTE
Current patient location: [facility] [address] [facility] [address]    Is the patient currently in the state of [place]? YES    Visit mode: VIDEO    If the visit is dropped, the patient can be reconnected by:VIDEO VISIT: Text to cell phone:   Telephone Information:   Mobile [phone]       Will anyone else be joining the visit? NO  (If patient encounters technical issues they should call [phone]    Are changes needed to the allergy or medication list? Yes pt has medication requested for removal     Are refills needed on medications prescribed by this physician? NO    Rooming Documentation:  Not applicable    Reason for visit: RECHECK (Review stone analysis- discuss prevention )    Brandi GARCIAF

## 2025-01-13 NOTE — PATIENT INSTRUCTIONS
"UROLOGY CLINIC VISIT PATIENT INSTRUCTIONS      If you have any issues, questions or concerns in the meantime, do not hesitate to contact us at [facility] at [phone] or via [employer].     Padmini Rogel CNP  Department of Urology       DIET & LIFESTYLE CHANGES FOR PATIENTS WITH KIDNEY STONES    If you've had a kidney stone, you are likely to form another. Risk of recurrence is 15% at 1 year, 35% to 40% at 2 years, and 50% at 10 years. Therefore, prevention is very important.  These recommendations have shown to be effective.    CALCIUM STONES (Oxalate and Phosphate)    Fluid intake is the most important prevention measure to help prevent stones. Fluid intake should be at least 2.5 liters per day or 90-120oz per day. With goal of urine output of >2.5L per day.   Increasing liquids that have citric acid may help such as low calorie orange juice, lemonade (Crystal Light Lemonade or True Lemon/Lime), or adding a citrus to your water.  You can add lemon juice or fresh nakul to your water daily.  Lemon juice increases the citrate in your urine, and helps decrease the formation of stone and even breakdown certain types of stones. Add a cap full/teaspoon of pure lemon juice to each glass.   Try to limit sugar, especially if you have diabetes.    Helpful Fluid Measurements:  1 liter = 34oz  1 quart = 32 oz  24 pack water: Each bottle 16.9 oz     Low Oxalate Diet: Limit your consumption of OXALATE-rich foods including:  All nuts and nut products including peanuts, almonds,peanut butter, almond milk  Spinach  Rhubarb  Beets  Chocolate  Soybeans and soy products     Website:   [email or web address]    Below is a link to a PDF that is based on [employer] research. Please stick to pages 6-9 of this document. My suggestion is to review the list of food that is OK. The \"avoid\" list can be " robert.  [email or web address] display [email or web address]_[email or web address]?mc_[email or web address]_eid=[email or web address]    Low Sodium Diet: Salt (sodium chloride) is found in abundance in many foods. High sodium levels in the urine can interfere with the kidney's handling of calcium.   Trying a DASH (Dietary Approaches to Stop Hypertension) diet which is eating more fruits and vegetables, limiting salt intake, moderate in low-fat diary products, and low in animal protein.   Try to decrease salt intake to <2000 mg of sodium daily.     Tips for reducing the salt in your diet:  Don't use salt at the table  Reduce the salt used in food preparation. Try 1/2 teaspoon when recipes call for 1 teaspoon.  Use herbs and spices for flavoring instead of salt.  Avoid salty foods.  Check the label before you buy or use a product. Note sodium and portion size information.  Try to consume less than 2,000 mg/day. (1 teaspoon = 2,000 mg)    Foods with high sodium content include:  Processed meat (including luncheon meats, sausage)   Crackers   Instant cereal   Processed cheese   Canned soups   Chips and snack foods   Soy sauce    Low Animal Protein: Reduce animal protein (meat) intake to no more than 8-10 ounces per day. Increase fruit and vegetables to 5 servings per day.    Maintain a normal calcium diet: Calcium rich foods are encouraged, but no more than 1000 - 1200 mg per day. Researches have found that people with low calcium intakes tend to have more stones. Foods with high calcium content are acceptable and include:  Calcium rich foods include:   Diary (cheese, milk, and yogurt)  Enriched cereals  Meat and fish  Dark green vegetables    Limit Vitamin C intake to < 1000 mg daily.      Uric Acid Stones  All of the above recommendations may be helpful  Reducing your intake of animal protein is also shown to be helpful.  Avoid high- and moderate- purine containing  foods  High: organ meats, anchovies, sardines, mackerel, and water fowl.   Moderate: shellfish, fish, game meats, beef, pork, and meat based soups/broths  You may be asked to take medication to make your urine more ALKALINE; this will help to dissolve your stones & prevent more from forming.  Medications can be used to reduce serum urate levels and/or decrease the risk for the incidence gout and the avoidance of medications/additives promoting hyperuricemia.   There is a correlation between uric acid stones and Gout. The lifestyle interventions, largely overlapping those used for the prevention of recurrent gout, include adjustment of dietary volume and composition, avoidance of alcohol, reduction to ideal body weight, and regular exercise.      STRUVITE STONES    All of the above recommendations may be helpful.  You may be asked to take medication to make your urine more ALKALINE; this will help to dissolve your stones & prevent more from forming.  Preventing struvite stones depends on staying infection-free. Diet has not been shown to affect struvite stone formation    CYSTINE STONES    All of the above recommendations may be helpful.  You must INCREASE your fluid intake EVEN more, with a goal urine output of > 3 quarts per day.  It's recommended that you drink at least 1 gallon of fluid every day, even throughout the night.  This may prove to be difficult; but the more fluids you drink the better.  Cystine, an amino acid (one of the building blocks of protein), leaks through the kidneys and into the urine to form crystals.    Consultation with a dietician may be helpful as well.  Please let our staff know if you are interested in this helpful option so a consult may be placed for you.    Jonathan Weil, PGY2 - 1st trimester bleed, stable w/ benign abd exam, very mild bleeding reported. Plan - TVUS, T&S, CBC, UA

## 2025-01-13 NOTE — PROGRESS NOTES
Urology Video Office Visit    Video-Visit Details    Type of service:  Video Visit    Video Start Time: 0930    Video End Time: 0950    Originating Location (pt. Location): Home    Distant Location (provider location):  On-site     Platform used for Video Visit: [employer]           Assessment and Plan:     Assessment:34 year old female with CaP/CaOx stones.    Plan:  -Reviewed stone analysis with patient.   -We discussed some general measures to prevent recurrent kidney stones.  These include fluid intake to achieve 2.5 liters of urine per day, decreased salt intake, normal calcium intake, lowering animal protein intake, avoiding high amounts of oxalate containing foods, and increased citrate intake with orange juice/lemonade.  -Recommend follow up 24 hour urine study after patient is done breastfeeding.   -If having severe flank pain, fevers, chills, nausea, or vomiting please notify Urology clinic or be seen in the ER.   -RTC in 1 year with KUB or sooner PRN.    Padimni Rogel CNP  Department of Urology  January 13, 2025    I spent a total of 25 minutes spent on the date of the encounter doing chart review, history and exam, documentation, and further activities as noted above.          Chief Complaint:   Right Ureteral Stone         History of Present Illness:    Mary Jo Vital is a pleasant 34 year old female who presents with concerns of a right ureteral stone.     Dr. Vital was last seen on 12/10/24 with concerns of a right 5mm UVJ stone. She was able to pass and retrieve her stone on 12/20/24.     Stone Analysis:   20% calcium oxalate (monohydrate and dihydrate), and   80% calcium phosphate (hydroxy- and carbonate- apatite).     Denies any ongoing flank pain, f/c/n/v, gross hematuria, or dysuria since her stone passage.     She was initially seen in the ED on 12/01/24 for concerns of RLQ pain and low back pain. CT scan on 12/01/24 (images personally reviewed) revealed a right 5mm UVJ stone with  hydronephrosis. Noted two punctate nonobstructing right renal stones. No noted left hydronephrosis or nephrolithiasis.       This was her first stone episode. Family history of nephrolithiasis in brother.      Stone Risk Factors: None     She is currently breastfeeding. Recently gave birth in June 2024.          Past Medical History:     Past Medical History:   Diagnosis Date    PCOS (polycystic ovarian syndrome)             Past Surgical History:     Past Surgical History:   Procedure Laterality Date    HC DILATION/CURETTAGE DIAG/THER NON OB N/A 9/5/2018    Procedure: DILATION AND CURETTAGE, UTERUS;  Surgeon: Ayana Garcia MD;  Location: [facility];  Service: Obstetrics    NO PAST SURGERIES              Medications     Current Outpatient Medications   Medication Sig Dispense Refill    cholecalciferol, vitamin D3, (VITAMIN D3) 4,000 unit cap [CHOLECALCIFEROL, VITAMIN D3, (VITAMIN D3) 4,000 UNIT CAP] Take 1 tablet by mouth daily.  0    lanolin (LANSINOH HPA) 100 % Oint [LANOLIN (LANSINOH HPA) 100 % OINT] Apply every hour as needed for breastfeeding pain relief.  0    prenatal vitamin iron-folic acid 27mg-0.8mg (PRENATAL S) 27 mg iron- 800 mcg Tab tablet [PRENATAL VITAMIN IRON-FOLIC ACID 27MG-0.8MG (PRENATAL S) 27 MG IRON- 800 MCG TAB TABLET] Take 1 tablet by mouth daily.       No current facility-administered medications for this visit.            Family History:   No family history on file.         Social History:     Social History     Socioeconomic History    Marital status:      Spouse name: Not on file    Number of children: Not on file    Years of education: Not on file    Highest education level: Not on file   Occupational History    Not on file   Tobacco Use    Smoking status: Never    Smokeless tobacco: Never   Substance and Sexual Activity    Alcohol use: No    Drug use: No    Sexual activity: Not on file   Other Topics Concern    Not on file   Social History Narrative    Not on file      Social Drivers of Health     Financial Resource Strain: Low Risk  (6/25/2024)    Received from [facility] [employer] [facility]    Financial Resource Strain     Difficulty of Paying Living Expenses: 3     Difficulty of Paying Living Expenses: Not on file   Food Insecurity: No Food Insecurity (6/25/2024)    Received from [facility] [employer] [facility]    Food Insecurity     Do you worry your food will run out before you are able to buy more?: 1   Transportation Needs: No Transportation Needs (6/25/2024)    Received from [facility] [employer] [facility] [employer] [facility]    Transportation Needs     Does lack of transportation keep you from medical appointments?: 1     Does lack of transportation keep you from work, meetings or getting things that you need?: 1   Physical Activity: Not on file   Stress: Not on file   Social Connections: Socially Integrated (6/25/2024)    Received from [facility] [employer] [facility]    Social Connections     Do you often feel lonely or isolated from those around you?: 0   Interpersonal Safety: Not on file   Housing Stability: Low Risk  (6/25/2024)    Received from [facility] [employer] [facility]    Housing Stability     What is your housing situation today?: 1            Allergies:   Latex and Sulfa (sulfonamide antibiotics) [sulfa antibiotics]         Review of Systems:  From intake questionnaire   Negative 14 system review except as noted on HPI, nurse's note.         Physical Exam:   General Appearance: Well groomed, hygenic  Eyes: No redness, discharge  Respiratory: No cough, no respiratory distress or labored breathing  Musculoskeletal: Grossly normal, full range of motion in upper extremities, no gross deficits  Skin: No discoloration or apparent rashes  Neurologic - No tremors  Psychiatric - Alert and oriented  The rest of a comprehensive physical examination is deferred due to video visit restrictions        Labs:    I personally  reviewed all applicable laboratory data and went over findings with patient  Significant for:    CBC RESULTS:  Recent Labs   Lab Test 12/01/24  1430 09/06/18  0855 09/05/18  0347 09/01/18  1157   WBC 3.9*  --  16.0* 10.9   HGB 12.1 7.6* 8.1* 10.9*     --  161 205        BMP RESULTS:  Recent Labs   Lab Test 12/01/24  1430 09/05/18  2106 09/05/18  0347     --  133*   POTASSIUM 4.2  --  3.4*   CHLORIDE 104  --  108*   CO2 23  --  17*   ANIONGAP 12  --  8   * 55 118   BUN 13.8  --  8   CR 0.81  --  0.61   GFRESTIMATED >90  --  >60   GFRESTBLACK  --   --  >60   GEORGIE 9.2  --  7.6*       UA RESULTS:   Recent Labs   Lab Test 12/01/24  1431   SG 1.031*   URINEPH 5.5   NITRITE Negative   RBCU 43*   WBCU 4       CALCIUM RESULTS  Lab Results   Component Value Date    GEORGIE 9.2 12/01/2024    GEORGIE 7.6 09/05/2018           Imaging:    I personally reviewed all applicable imaging and went over the below findings with patient.    Narrative & Impression   EXAM: CT ABDOMEN PELVIS W CONTRAST  LOCATION: [facility]  DATE: 12/1/2024     INDICATION: Right lower quadrant abdominal pain.  COMPARISON: Pelvic ultrasound 12/1/2024.  TECHNIQUE: CT scan of the abdomen and pelvis was performed following injection of IV contrast. Multiplanar reformats were obtained. Dose reduction techniques were used.  CONTRAST: ISOVUE 370 90mL     FINDINGS:      LOWER CHEST: Small hiatal hernia.     HEPATOBILIARY: Heterogeneously enhancing 1.8 cm lesion within hepatic segment 7 (3/#38). Small benign hepatic cysts within both hepatic lobes. No calcified gallstones or biliary ductal dilation.     PANCREAS: Normal.     SPLEEN: Splenomegaly measuring 14.6 cm.     ADRENAL GLANDS: Normal.     KIDNEYS/BLADDER: Obstructing 5 mm calculus at the right uterovesical junction resulting in moderate upstream right ureterectasis and hydronephrosis. Two punctate nonobstructing right intrarenal calculi. Normal left kidney. Bladder is  partially   decompressed and otherwise normal.     BOWEL: No bowel obstruction or inflammation. Normal appendix.     LYMPH NODES: No enlarged lymph nodes.     VASCULATURE: Patent portal, splenic, and superior mesenteric veins. No abdominal aortic aneurysm.     PELVIC ORGANS: Normal.     MUSCULOSKELETAL: Small fat-containing periumbilical hernia. No acute bony abnormality.                                                                      IMPRESSION:      1.  Obstructing 5 mm distal right ureteral calculus at the ureterovesical junction resulting in moderate right hydronephrosis.     2.  Two punctate nonobstructing right intrarenal calculi. No left renal calculi.     3.  Indeterminant 1.8 cm right hepatic lesion, most likely a hemangioma. This can be confirmed with a nonemergent contrast-enhanced liver MRI.     4.  Mild splenomegaly.     5.  Small hiatal hernia.